# Patient Record
Sex: FEMALE | Race: WHITE | Employment: FULL TIME | URBAN - METROPOLITAN AREA
[De-identification: names, ages, dates, MRNs, and addresses within clinical notes are randomized per-mention and may not be internally consistent; named-entity substitution may affect disease eponyms.]

---

## 2019-10-25 ENCOUNTER — OFFICE VISIT (OUTPATIENT)
Dept: FAMILY MEDICINE | Facility: CLINIC | Age: 29
End: 2019-10-25
Payer: COMMERCIAL

## 2019-10-25 VITALS
HEIGHT: 62 IN | SYSTOLIC BLOOD PRESSURE: 110 MMHG | HEART RATE: 75 BPM | TEMPERATURE: 99.4 F | WEIGHT: 138.6 LBS | BODY MASS INDEX: 25.51 KG/M2 | DIASTOLIC BLOOD PRESSURE: 64 MMHG | OXYGEN SATURATION: 100 %

## 2019-10-25 DIAGNOSIS — L72.0 EPIDERMOID CYST: Primary | ICD-10-CM

## 2019-10-25 PROCEDURE — 99203 OFFICE O/P NEW LOW 30 MIN: CPT | Performed by: PHYSICIAN ASSISTANT

## 2019-10-25 RX ORDER — MINOCYCLINE HYDROCHLORIDE 100 MG/1
100 CAPSULE ORAL 2 TIMES DAILY
Refills: 12 | COMMUNITY
Start: 2018-12-20

## 2019-10-25 RX ORDER — CEPHALEXIN 500 MG/1
500 CAPSULE ORAL 4 TIMES DAILY
Qty: 40 CAPSULE | Refills: 0 | Status: SHIPPED | OUTPATIENT
Start: 2019-10-25 | End: 2019-11-04

## 2019-10-25 RX ORDER — LORATADINE 10 MG/1
10 TABLET ORAL DAILY
COMMUNITY

## 2019-10-25 ASSESSMENT — MIFFLIN-ST. JEOR: SCORE: 1318.94

## 2019-10-25 NOTE — PATIENT INSTRUCTIONS
Patient Education     Understanding Epidermoid Cyst    An epidermoid cyst is a small abnormal growth in the top layers of the skin. It is filled with keratin, the same proteins that make up your hair and nails. These cysts grow slowly. They can grow anywhere on the body. But they are most often found on the face, behind the ears, and on the chest or upper back.  How to say it  [id number] [id number]   What causes an epidermoid cyst?  An epidermoid cyst may occur because of an injury to the skin or from acne.  Symptoms of an epidermoid cyst  An epidermoid cyst is a subcutaneous bump. This means it is just below the skin. It may be yellow or skin-colored. It often has a small black lupillo in the middle of it, like a blackhead.  An epidermoid cyst rarely causes pain, unless it ruptures or becomes infected. It may ooze keratin, a white, cheesy, smelly material. If the cyst becomes inflamed or infected, it may be:    Bad smelling    Red    Swollen    Tender  Treatment for an epidermoid cyst  Many epidermoid cysts don t cause any problems. They don t necessarily need to be treated. They may go away on their own. But you may want to treat it if you don t like how it looks or it becomes inflamed.  Treatment options include:    Steroid injection. A steroid may be injected into the cyst. This may help ease inflammation. It may also prevent infection.    Surgical removal. The cyst can be cut out. It may also need to be drained first. There is a slight chance the cyst may come back.    Antibiotics. In some cases, you may need to take an oral antibiotic to prevent infection and regrowth.  When to call your healthcare provider  Call your healthcare provider right away if you have any of these:    Fever of 100.4 F (38 C) or higher, or as directed    Redness, swelling, or fluid leaking from your incision that gets worse    Pain that gets worse    Symptoms that don t get better, or get worse    New symptoms   Date Last Reviewed:  5/1/2016 2000-2018 [employer]. [address] [place] [address], [place] [address]. All rights reserved. This information is not intended as a substitute for professional medical care. Always follow your healthcare professional's instructions.